# Patient Record
Sex: MALE | URBAN - METROPOLITAN AREA
[De-identification: names, ages, dates, MRNs, and addresses within clinical notes are randomized per-mention and may not be internally consistent; named-entity substitution may affect disease eponyms.]

---

## 2019-12-10 ENCOUNTER — OFFICE VISIT (OUTPATIENT)
Dept: ORTHOPEDIC SURGERY | Facility: CLINIC | Age: 24
End: 2019-12-10

## 2019-12-10 VITALS
BODY MASS INDEX: 42.66 KG/M2 | WEIGHT: 315 LBS | SYSTOLIC BLOOD PRESSURE: 124 MMHG | HEIGHT: 72 IN | DIASTOLIC BLOOD PRESSURE: 66 MMHG | HEART RATE: 77 BPM | RESPIRATION RATE: 16 BRPM | TEMPERATURE: 97.2 F | OXYGEN SATURATION: 96 %

## 2019-12-10 DIAGNOSIS — M25.562 ACUTE PAIN OF LEFT KNEE: Primary | ICD-10-CM

## 2019-12-10 DIAGNOSIS — E66.01 CLASS 3 SEVERE OBESITY WITH BODY MASS INDEX (BMI) OF 50.0 TO 59.9 IN ADULT, UNSPECIFIED OBESITY TYPE, UNSPECIFIED WHETHER SERIOUS COMORBIDITY PRESENT (HCC): ICD-10-CM

## 2019-12-10 DIAGNOSIS — E66.01 OBESITY, MORBID (HCC): ICD-10-CM

## 2019-12-10 NOTE — PROGRESS NOTES
Patient: Mohan Szymanski                MRN: [id number]       SSN: [id number]  YOB: 1995        AGE: 21 y.o. SEX: male    PCP: No primary care provider on file. 12/10/19    CC: LEFT KNEE CHECK UP    HISTORY:  Mohan Szymanski is a 21 y.o. male who is seen for left knee check up. He experienced knee pain in November but now feels much better. He noted pain with standing, walking, and stair climbing. He felt startup pain after sitting. He bought a knee brace from [facility] immediately after his pain began. He heard a pop in his knee after he saw his PCP and has felt much better since. He was referred by his PCP. He already had the appointment and did not want to cancel even though he feels better now. Pain Assessment  12/10/2019   Location of Pain Knee   Location Modifiers Left   Severity of Pain 0   Quality of Pain (No Data)   Quality of Pain Comment pt states no pain today   Duration of Pain (No Data)   Duration of Pain Comment n/a   Frequency of Pain (No Data)   Frequency of Pain Comment n/a   Aggravating Factors (No Data)   Aggravating Factors Comment n/a   Limiting Behavior No   Relieving Factors (No Data)   Relieving Factors Comment n/a   Result of Injury No     Occupation, etc:  Mr. Charanjit Ochoa is not currently employed. He previously worked as a  for [employer]. He lives in [place]. He is not very active. He likes to play video games. He has gained weight recently. Mr. Charanjit Ochoa weighs 370 lbs and is 6'0\" tall. No results found for: HBA1C, HGBE8, HWR1YLKK, FQX0EXXI, NOL8NAZI  Weight Metrics 12/10/2019   Weight 370 lb   BMI 50.18 kg/m2       There is no problem list on file for this patient. REVIEW OF SYSTEMS: All Below are Negative except: See HPI   Constitutional: negative for fever, chills, and weight loss.    Cardiovascular: negative for chest pain, claudication, leg swelling, SOB, LAZARO   Gastrointestinal: Negative for pain, N/V/C/D, Blood in stool or urine, dysuria, hematuria, incontinence, pelvic pain. Musculoskeletal: See HPI   Neurological: Negative for dizziness and weakness. Negative for headaches, Visual changes, confusion, seizures   Phychiatric/Behavioral: Negative for depression, memory loss, substance abuse. Extremities: Negative for hair changes, rash, or skin lesion changes. Hematologic: Negative for bleeding problems, bruising, pallor or swollen lymph nodes   Peripheral Vascular: No calf pain, no circulation deficits. Social History     Socioeconomic History    Marital status: UNKNOWN     Spouse name: Not on file    Number of children: Not on file    Years of education: Not on file    Highest education level: Not on file   Occupational History    Not on file   Social Needs    Financial resource strain: Not on file    Food insecurity:     Worry: Not on file     Inability: Not on file    Transportation needs:     Medical: Not on file     Non-medical: Not on file   Tobacco Use    Smoking status: Never Smoker    Smokeless tobacco: Never Used   Substance and Sexual Activity    Alcohol use: Not on file    Drug use: Not on file    Sexual activity: Not on file   Lifestyle    Physical activity:     Days per week: Not on file     Minutes per session: Not on file    Stress: Not on file   Relationships    Social connections:     Talks on phone: Not on file     Gets together: Not on file     Attends [employer] service: Not on file     Active member of club or organization: Not on file     Attends meetings of clubs or organizations: Not on file     Relationship status: Not on file    Intimate partner violence:     Fear of current or ex partner: Not on file     Emotionally abused: Not on file     Physically abused: Not on file     Forced sexual activity: Not on file   Other Topics Concern    Not on file   Social History Narrative    Not on file      No Known Allergies   No current outpatient medications on file.      No current facility-administered medications for this visit. PHYSICAL EXAMINATION:  Visit Vitals  /66 (BP 1 Location: Left arm, BP Patient Position: Sitting)   Pulse 77   Temp 97.2 °F (36.2 °C) (Oral)   Resp 16   Ht 6' (1.829 m)   Wt (!) 370 lb (167.8 kg)   SpO2 96%   BMI 50.18 kg/m²      ORTHO EXAMINATION:  Examination Right knee Left knee   Skin Intact Intact   Range of motion 120-0 100-0   Effusion - -   Medial joint line tenderness - -   Lateral joint line tenderness - -   Popliteal tenderness - -   Osteophytes palpable - -   Gregs - -   Patella crepitus - -   Anterior drawer - -   Lateral laxity - -   Medial laxity - -   Varus deformity - -   Valgus deformity - -   Pretibial edema ++ ++   Calf tenderness - -       IMPRESSION:      ICD-10-CM ICD-9-CM    1. Acute pain of left knee M25.562 719.46    2. Class 3 severe obesity with body mass index (BMI) of 50.0 to 59.9 in adult, unspecified obesity type, unspecified whether serious comorbidity present ([facility] E66.01 278.01     Z68.43 V85.43      PLAN:  He is doing well so no treatment is necessary. He will be referred for bariatric surgery consultation. Dietary counseling provided today. Start weight loss with low carb diet and intermittent fasting. He will follow up as needed.       Scribed by Narciso Banks ([address]) as dictated by Humberto Williamson MD

## 2019-12-10 NOTE — PROGRESS NOTES
1. Have you been to the ER, urgent care clinic since your last visit? Hospitalized since your last visit? Yes, [facility] Dr. Massiel Mooney [facility]       2. Have you seen or consulted any other health care providers outside of the [address] [place] [address] since your last visit? Include any pap smears or colon screening.   Yes